# Patient Record
(demographics unavailable — no encounter records)

---

## 2024-11-13 NOTE — PLAN
[FreeTextEntry1] : I reviewed the xray report from urgent care. She was advised to continue use of the surgical shoe and to rest and elevate apply ice when she can. She was advised to schedule an orthopedic consultation.

## 2024-11-13 NOTE — PHYSICAL EXAM
[No Acute Distress] : no acute distress [Well Nourished] : well nourished [Well Developed] : well developed [Well-Appearing] : well-appearing [Normal Voice/Communication] : normal voice/communication [Normal Mood] : the mood was normal [de-identified] : left first toe is tender to palpation and is "misael-taped" to the second toe

## 2024-11-13 NOTE — HISTORY OF PRESENT ILLNESS
[FreeTextEntry8] : pt presents with broken toe on Saturday, [facility] MD 11/10.  She was running in the living room and stubbed her left great toe on the coffee tabke.  She ws seen at urgent care and found to have a fracture.  Sheis wearing a surgical shoe and taking Motrin as needed.

## 2024-11-14 NOTE — HISTORY OF PRESENT ILLNESS
[Sudden] : sudden [4] : 4 [Throbbing] : throbbing [Tingling] : tingling [Constant] : constant [Household chores] : household chores [Leisure] : leisure [Rest] : rest [Meds] : meds [Sitting] : sitting [Standing] : standing [Walking] : walking [de-identified] : Patient presents today for evaluation of the left great toe. Patient states that on 11/9/24 she has had pain following slamming her toe into a step stool while playing with her kids. Patient went to [facility] where they took Xrays and told her she had a fracture and was ref. to Ortho. Patient reports she has some numbness and tingling sensation in the toe.  Patient reports that she has been taking Motrin as needed for pain at this time. Patient presents today wearing post op shoe and ambulating without support.  [] : This patient has had an injection before: no [FreeTextEntry1] : RT  [FreeTextEntry5] : 11/9/24

## 2024-11-14 NOTE — ASSESSMENT
[FreeTextEntry1] : 36yoF with left foot 1st distal phalanx great toe fracture - nondisplaced.  Recommend nonsurgical management.  -Activities as tolerated -Rest, ice, compression, elevation, NSAIDs PRN for pain.  -All questions answered -F/u 8 weeks with repeat xrays  The diagnosis was explained in detail. The potential non-surgical and surgical treatments were reviewed. The relative risks and benefits of each option were considered relative to the patients age, activity level, medical history, symptom severity and previously attempted treatments.  The patient was advised to consult with their primary medical provider prior to initiation of any new medications to reduce the risk of adverse effects specific to their long-term home medications and medical history. The risk of gastrointestinal irritation and kidney injury specific to long-term NSAID use was discussed.

## 2024-11-14 NOTE — PHYSICAL EXAM
[de-identified] : Examination of the left foot and ankle is as follows: INSPECTION: mild swelling of 1st distal phalanx great toe, but no abrasion, no laceration, no erythema, no ecchymosis, no gross deformity PALPATION: 1st distal phalanx ROM: MPT joint DF 20 degrees, MTP joint PF 10 degrees, but dorsiflexion 20 degrees, plantar flexion 40 degrees, inversion 30 degrees, eversion 20 degrees. STRENGTH: dorsiflexion 5/5. plantar flexion 5/5, inversion 5/5, eversion 5/5, EHL 4/5, FHL 4/5 VASCULAR: dorsalis pedis pulse: 2+, posterior tibialis pulse: 2+ NEURO: Sensation present to light touch in all distributions GAIT: mildly antalgic, but patient ambulates without assistive device   X-rays of the left foot is as follows: Foot 3 view AP/Lateral/Oblique: nondisplaced distal phalanx fracture.

## 2025-01-06 NOTE — PLAN
[FreeTextEntry1] : I reviewed the most recent lab results including A1C, CBC, CMP, lipids and TSH.  She has prediabetes and it is medically necessary for her to lose weight.  The potential side effects of Zepbound were reviewed with her and she will start treatment at 2.5 mg weekly.  She was advised to follow up in 3 months and to send me a portal message about 4 weeks after starting treatment.

## 2025-01-06 NOTE — HISTORY OF PRESENT ILLNESS
[FreeTextEntry1] : pt presents for med check for weight loss  [de-identified] : She stopped nursing her baby in September.  She reports that she has been following the [employer] program and despite drinking a lot of water and following the program she hasn't been able to lose weight.  She is unable to control her appetite.  She doesn't have time to exercise regularly at this time as she is caring for her two small children.